# Patient Record
Sex: MALE | Race: WHITE | NOT HISPANIC OR LATINO
[De-identification: names, ages, dates, MRNs, and addresses within clinical notes are randomized per-mention and may not be internally consistent; named-entity substitution may affect disease eponyms.]

---

## 2017-12-15 PROBLEM — Z00.00 ENCOUNTER FOR PREVENTIVE HEALTH EXAMINATION: Status: ACTIVE | Noted: 2017-12-15

## 2017-12-27 ENCOUNTER — APPOINTMENT (OUTPATIENT)
Dept: CARDIOLOGY | Facility: CLINIC | Age: 67
End: 2017-12-27

## 2019-05-24 ENCOUNTER — RECORD ABSTRACTING (OUTPATIENT)
Age: 69
End: 2019-05-24

## 2019-05-24 DIAGNOSIS — Z78.9 OTHER SPECIFIED HEALTH STATUS: ICD-10-CM

## 2019-05-24 DIAGNOSIS — Z82.3 FAMILY HISTORY OF STROKE: ICD-10-CM

## 2019-05-28 ENCOUNTER — NON-APPOINTMENT (OUTPATIENT)
Age: 69
End: 2019-05-28

## 2019-05-28 ENCOUNTER — APPOINTMENT (OUTPATIENT)
Dept: CARDIOLOGY | Facility: CLINIC | Age: 69
End: 2019-05-28
Payer: COMMERCIAL

## 2019-05-28 VITALS
WEIGHT: 306 LBS | BODY MASS INDEX: 45.32 KG/M2 | RESPIRATION RATE: 14 BRPM | HEART RATE: 76 BPM | DIASTOLIC BLOOD PRESSURE: 76 MMHG | HEIGHT: 69 IN | SYSTOLIC BLOOD PRESSURE: 138 MMHG

## 2019-05-28 DIAGNOSIS — E11.9 TYPE 2 DIABETES MELLITUS W/OUT COMPLICATIONS: ICD-10-CM

## 2019-05-28 PROCEDURE — 99215 OFFICE O/P EST HI 40 MIN: CPT

## 2019-05-28 PROCEDURE — 93000 ELECTROCARDIOGRAM COMPLETE: CPT

## 2019-05-28 RX ORDER — FUROSEMIDE 40 MG/1
40 TABLET ORAL
Refills: 0 | Status: ACTIVE | COMMUNITY

## 2019-05-29 NOTE — REASON FOR VISIT
[FreeTextEntry1] : The patient is here today for cardiac reevaluation with known history of hypertension, hyperlipidemia, insulin-dependent diabetes and recent congestive heart failure exacerbation-(HFpEF)- during hospital admission at [facility].  He reports running out of his diuretics (Lasix 40 mg) for approximately one week and then ate chinese food a couple times, started feeling short of breath when going to bed and  then woke up around 2 am feeling very short of breath.  He was successfully diuresed by IV at the hospital, he reports feeling much better now and no additional cardiac complaints.  The patient denies CP, SOB, SOLORZANO, PND, orthopnea, palpitations, presyncope, syncope.

## 2019-05-29 NOTE — HISTORY OF PRESENT ILLNESS
[FreeTextEntry1] : Patient is tolerating cardiac medications without negative side effects including Quinapril 40 mg BID, Norvasc 10 mg QD, Lipitor 20 mg QHS, Hydralazine 50 mg TID, HCTZ 25 mg QD, Lasix 40 mg QD, Potassium 10 mEQ QD, Metoprolol Succinate 50 mg QD.\par \par Most recent Transthoracic Echocardiogram during his hospital course (5/13/2019):  Left ventricle wall thickness mildly increased with an EF of 60 to 65%, right ventricle mildly dilated, left and right atria mildly dilated, mild MR, trivial AR, mild TR, trivial MD, NO pericardial effusion.

## 2019-05-29 NOTE — DISCUSSION/SUMMARY
[FreeTextEntry1] : 1).  Patient is tolerating cardiac medications without negative side effects, continue with current cardiac medication regimen (refer above).\par \par 2).  Strongly encouraged patient to refrain from (salt loading) and begin eating a well balanced diet low in fats and carbohydrates.  He should begin an aerobic exercise regimen including walking / biking 4 to 5 days per week.\par \par 3).  Will discuss repeating a Nuclear Stress test in the future, possibly sometime next year.\par \par 4).  Follow up with PCP (Dr. Hood) regarding routine checkups and blood work, have copy faxed to our office. \par \par 5).  Immediately go to the emergency department and/or report any untoward symptoms. \par \par 6).  Follow up with our office in 3 months or PRN.

## 2019-05-29 NOTE — PHYSICAL EXAM
[Normal Appearance] : normal appearance [General Appearance - In No Acute Distress] : no acute distress [Normal Oral Mucosa] : normal oral mucosa [Normal Conjunctiva] : the conjunctiva exhibited no abnormalities [Normal Jugular Venous A Waves Present] : normal jugular venous A waves present [No Jugular Venous Jaime A Waves] : no jugular venous jaime A waves [Normal Jugular Venous V Waves Present] : normal jugular venous V waves present [Respiration, Rhythm And Depth] : normal respiratory rhythm and effort [] : no respiratory distress [Auscultation Breath Sounds / Voice Sounds] : lungs were clear to auscultation bilaterally [Heart Rate And Rhythm] : heart rate and rhythm were normal [Heart Sounds] : normal S1 and S2 [Nail Clubbing] : no clubbing of the fingernails [Abnormal Walk] : normal gait [Bowel Sounds] : normal bowel sounds [Cyanosis, Localized] : no localized cyanosis [Impaired Insight] : insight and judgment were intact [Oriented To Time, Place, And Person] : oriented to person, place, and time [Skin Color & Pigmentation] : normal skin color and pigmentation [Affect] : the affect was normal [FreeTextEntry1] : Grade I/VI systolic murmur, 1 to 2 + lower extremity edema with hyperpigmentation.

## 2019-05-29 NOTE — ASSESSMENT
[FreeTextEntry1] : EKG 5/28/2019:  The EKG illustrates sinus rhythm, rate of 76, no significant ST-T wave changes.  Essentially unchanged.\par \par Most recent Blood work (5/14/2019):  Pro BNP (1,453), Troponins negative x 4, Potassium (4.3) Sodium (140), creatinine (1.5), BUN (39).

## 2019-09-04 ENCOUNTER — APPOINTMENT (OUTPATIENT)
Dept: CARDIOLOGY | Facility: CLINIC | Age: 69
End: 2019-09-04

## 2021-12-17 ENCOUNTER — NON-APPOINTMENT (OUTPATIENT)
Age: 71
End: 2021-12-17

## 2021-12-18 ENCOUNTER — NON-APPOINTMENT (OUTPATIENT)
Age: 71
End: 2021-12-18

## 2021-12-22 ENCOUNTER — NON-APPOINTMENT (OUTPATIENT)
Age: 71
End: 2021-12-22

## 2021-12-22 ENCOUNTER — APPOINTMENT (OUTPATIENT)
Dept: CARDIOLOGY | Facility: CLINIC | Age: 71
End: 2021-12-22
Payer: MEDICARE

## 2021-12-22 VITALS
WEIGHT: 314 LBS | BODY MASS INDEX: 46.51 KG/M2 | HEIGHT: 69 IN | SYSTOLIC BLOOD PRESSURE: 160 MMHG | DIASTOLIC BLOOD PRESSURE: 70 MMHG | HEART RATE: 69 BPM | RESPIRATION RATE: 16 BRPM

## 2021-12-22 PROCEDURE — 99214 OFFICE O/P EST MOD 30 MIN: CPT

## 2021-12-22 PROCEDURE — 93000 ELECTROCARDIOGRAM COMPLETE: CPT

## 2021-12-22 RX ORDER — POTASSIUM CHLORIDE 10 MEQ
10 CAPSULE, EXTENDED RELEASE ORAL
Refills: 0 | Status: DISCONTINUED | COMMUNITY
End: 2021-12-22

## 2021-12-22 RX ORDER — AMLODIPINE BESYLATE 10 MG/1
10 TABLET ORAL
Refills: 0 | Status: DISCONTINUED | COMMUNITY
End: 2021-12-22

## 2021-12-22 RX ORDER — QUINAPRIL HYDROCHLORIDE 40 MG/1
40 TABLET, FILM COATED ORAL
Refills: 0 | Status: DISCONTINUED | COMMUNITY
End: 2021-12-22

## 2021-12-22 RX ORDER — METOPROLOL SUCCINATE 50 MG/1
50 TABLET, EXTENDED RELEASE ORAL
Refills: 0 | Status: DISCONTINUED | COMMUNITY
End: 2021-12-22

## 2021-12-22 RX ORDER — HYDRALAZINE HYDROCHLORIDE 50 MG/1
50 TABLET ORAL
Qty: 270 | Refills: 1 | Status: DISCONTINUED | COMMUNITY
End: 2021-12-22

## 2021-12-22 RX ORDER — EMPAGLIFLOZIN 10 MG/1
10 TABLET, FILM COATED ORAL
Refills: 0 | Status: DISCONTINUED | COMMUNITY
End: 2021-12-22

## 2021-12-22 NOTE — ASSESSMENT
[FreeTextEntry1] : EKG demonstrated normal sinus rhythm at a rate of 69; borderline first degree AV block, left atrial abnormality, nonspecific T-wave flattening;\par \par In summary, this 71-year-old gentleman has a history ofhypertension, diastolic dysfunction and remote history for diastolic heart failure, insulin-dependent diabetes, chronic obesity and salt loading with chronic lymphedema of the lower extremities and recent hospitalization for pneumonia with some ongoing slight exertional dyspnea and cough but overall improved;\par \par Plan:\par \par Agree with continuing current medical regimen including diuretic;\par \par Reinforce importance again for low sodium diet and weight reducing diet;\par \par Recommended in the future vascular surgical evaluation for lower extremities;\par \par Patient to followup with pulmonary and renal in the near future for recently detected mild renal insufficiency;\par \par Followup to our office within 3-4 months or p.r.n.;'

## 2021-12-22 NOTE — HISTORY OF PRESENT ILLNESS
[FreeTextEntry1] : Today, patient states he still has some mild shortness of breath with exertion but overall feeling better. There is been no chills or fever. He denies chest pain, palpitations or dizziness.\par \par Patient has gained additional weight since his last visit over a year ago, and was also noted to have elevation of systolic blood pressure today;\par \par He is accompanied with his wife;\par \par Last nuclear stress test was in 2017-with pharmacologic protocol and was negative for ischemia on myocardial perfusion imaging;

## 2021-12-22 NOTE — PHYSICAL EXAM
[Well Developed] : well developed [Well Nourished] : well nourished [No Acute Distress] : no acute distress [Normal Conjunctiva] : normal conjunctiva [Normal Venous Pressure] : normal venous pressure [No Carotid Bruit] : no carotid bruit [Normal S1, S2] : normal S1, S2 [No Rub] : no rub [No Gallop] : no gallop [Good Air Entry] : good air entry [No Respiratory Distress] : no respiratory distress  [Soft] : abdomen soft [Non Tender] : non-tender [No Masses/organomegaly] : no masses/organomegaly [Normal Bowel Sounds] : normal bowel sounds [Normal Gait] : normal gait [Venous stasis] : venous stasis [Venous varicosities] : venous varicosities [No Rash] : no rash [Moves all extremities] : moves all extremities [No Focal Deficits] : no focal deficits [Normal Speech] : normal speech [Alert and Oriented] : alert and oriented [Normal memory] : normal memory [de-identified] : regular rhythm, distant S1-S2, grade 1-2/6 systolic murmur [de-identified] : slightly diminished breath sounds bilaterally with a few scant rhonchi with cough [de-identified] : obese 3-4+ [de-identified] : chronic lymphedema and stasis dermatosis of the lower extremities at least 3+ bilaterally [de-identified] : dusky changes stasis dermatosis of lower extremity

## 2021-12-22 NOTE — REASON FOR VISIT
[Cardiac Failure] : cardiac failure [Arrhythmia/ECG Abnorrmalities] : arrhythmia/ECG abnormalities [Structural Heart and Valve Disease] : structural heart and valve disease [Hyperlipidemia] : hyperlipidemia [Hypertension] : hypertension [Spouse] : spouse [FreeTextEntry3] : VAL PURI [FreeTextEntry1] : The patient is a 71-year-old white male with a history for hypertension and hyperlipidemia as well as insulin-dependent diabetes and prior history of severe obesity with fluid overload and congestive heart failure believed to be secondary to "diastolic dysfunction or HFpEF); chronic lymphedema and swelling of the lower extremities;\par \par Patient was lost to followup over the past year and a half but presents back to the office today following urgent hospitalization for dyspnea, PND orthopnea, with cough congestion and found to have pneumonia;\par \par He was admitted to [facility] and seen by cardiology on December 18, 2021;\par \par Ultimately, patient was discharged home a few days later after being treated with antibiotics and given IV Lasix but no definite fluid overload was found at the time;

## 2021-12-22 NOTE — REVIEW OF SYSTEMS
[Weight Gain (___ Lbs)] : [unfilled] ~Ulb weight gain [Dyspnea on exertion] : dyspnea during exertion [Lower Ext Edema] : lower extremity edema [Cough] : cough [Negative] : Heme/Lymph

## 2022-01-01 ENCOUNTER — APPOINTMENT (OUTPATIENT)
Dept: CARDIOLOGY | Facility: CLINIC | Age: 72
End: 2022-01-01

## 2022-01-01 ENCOUNTER — APPOINTMENT (OUTPATIENT)
Dept: CARDIOLOGY | Facility: CLINIC | Age: 72
End: 2022-01-01
Payer: MEDICARE

## 2022-01-01 ENCOUNTER — NON-APPOINTMENT (OUTPATIENT)
Age: 72
End: 2022-01-01

## 2022-01-01 VITALS
BODY MASS INDEX: 45.77 KG/M2 | SYSTOLIC BLOOD PRESSURE: 152 MMHG | DIASTOLIC BLOOD PRESSURE: 84 MMHG | WEIGHT: 309 LBS | HEART RATE: 71 BPM | HEIGHT: 69 IN | RESPIRATION RATE: 16 BRPM

## 2022-01-01 VITALS
WEIGHT: 302 LBS | RESPIRATION RATE: 16 BRPM | HEIGHT: 69 IN | BODY MASS INDEX: 44.73 KG/M2 | SYSTOLIC BLOOD PRESSURE: 190 MMHG | DIASTOLIC BLOOD PRESSURE: 97 MMHG | HEART RATE: 82 BPM

## 2022-01-01 VITALS
HEART RATE: 70 BPM | HEIGHT: 69 IN | RESPIRATION RATE: 16 BRPM | DIASTOLIC BLOOD PRESSURE: 88 MMHG | SYSTOLIC BLOOD PRESSURE: 170 MMHG | BODY MASS INDEX: 46.51 KG/M2 | WEIGHT: 314 LBS

## 2022-01-01 VITALS — SYSTOLIC BLOOD PRESSURE: 160 MMHG | DIASTOLIC BLOOD PRESSURE: 88 MMHG

## 2022-01-01 DIAGNOSIS — E78.5 HYPERLIPIDEMIA, UNSPECIFIED: ICD-10-CM

## 2022-01-01 DIAGNOSIS — I10 ESSENTIAL (PRIMARY) HYPERTENSION: ICD-10-CM

## 2022-01-01 DIAGNOSIS — R06.02 SHORTNESS OF BREATH: ICD-10-CM

## 2022-01-01 DIAGNOSIS — I50.9 HEART FAILURE, UNSPECIFIED: ICD-10-CM

## 2022-01-01 PROCEDURE — 99214 OFFICE O/P EST MOD 30 MIN: CPT | Mod: 25

## 2022-01-01 PROCEDURE — 93000 ELECTROCARDIOGRAM COMPLETE: CPT

## 2022-01-01 PROCEDURE — 78452 HT MUSCLE IMAGE SPECT MULT: CPT

## 2022-01-01 PROCEDURE — 99214 OFFICE O/P EST MOD 30 MIN: CPT

## 2022-01-01 PROCEDURE — A9500: CPT

## 2022-01-01 PROCEDURE — 93015 CV STRESS TEST SUPVJ I&R: CPT

## 2022-01-01 PROCEDURE — 93306 TTE W/DOPPLER COMPLETE: CPT

## 2022-01-01 RX ORDER — ISOSORBIDE MONONITRATE 30 MG/1
30 TABLET, EXTENDED RELEASE ORAL DAILY
Refills: 0 | Status: DISCONTINUED | COMMUNITY

## 2022-01-01 RX ORDER — CARVEDILOL 25 MG/1
25 TABLET, FILM COATED ORAL TWICE DAILY
Refills: 0 | Status: ACTIVE | COMMUNITY

## 2022-01-01 RX ORDER — HYDROCHLOROTHIAZIDE 25 MG/1
25 TABLET ORAL DAILY
Qty: 90 | Refills: 3 | Status: DISCONTINUED | COMMUNITY
Start: 2019-05-28 | End: 2022-01-01

## 2022-01-01 RX ORDER — NIFEDIPINE 30 MG/1
30 TABLET, EXTENDED RELEASE ORAL DAILY
Qty: 90 | Refills: 3 | Status: ACTIVE | COMMUNITY
Start: 2022-01-01 | End: 1900-01-01

## 2022-01-01 RX ORDER — REGADENOSON 0.08 MG/ML
0.4 INJECTION, SOLUTION INTRAVENOUS
Qty: 4 | Refills: 0 | Status: COMPLETED | OUTPATIENT
Start: 2022-01-01

## 2022-01-01 RX ORDER — AMLODIPINE BESYLATE AND BENAZEPRIL HYDROCHLORIDE 5; 40 MG/1; MG/1
5-40 CAPSULE ORAL
Qty: 90 | Refills: 0 | Status: DISCONTINUED | COMMUNITY
End: 2022-01-01

## 2022-01-01 RX ORDER — INSULIN LISPRO 100 [IU]/ML
INJECTION, SOLUTION INTRAVENOUS; SUBCUTANEOUS
Refills: 0 | Status: DISCONTINUED | COMMUNITY
End: 2022-01-01

## 2022-01-01 RX ORDER — ISOSORBIDE MONONITRATE 60 MG/1
60 TABLET, EXTENDED RELEASE ORAL DAILY
Refills: 0 | Status: DISCONTINUED | COMMUNITY

## 2022-01-01 RX ORDER — METOPROLOL TARTRATE 50 MG/1
50 TABLET, FILM COATED ORAL
Qty: 180 | Refills: 3 | Status: DISCONTINUED | COMMUNITY
Start: 2021-12-22 | End: 2022-01-01

## 2022-01-01 RX ORDER — ISOSORBIDE MONONITRATE 120 MG/1
120 TABLET, EXTENDED RELEASE ORAL
Refills: 0 | Status: ACTIVE | COMMUNITY

## 2022-01-01 RX ORDER — KIT FOR THE PREPARATION OF TECHNETIUM TC99M SESTAMIBI 1 MG/5ML
INJECTION, POWDER, LYOPHILIZED, FOR SOLUTION PARENTERAL
Refills: 0 | Status: COMPLETED | OUTPATIENT
Start: 2022-01-01

## 2022-01-01 RX ORDER — ATORVASTATIN CALCIUM 20 MG/1
20 TABLET, FILM COATED ORAL
Refills: 0 | Status: DISCONTINUED | COMMUNITY
End: 2022-01-01

## 2022-01-01 RX ORDER — PERFLUTREN 6.52 MG/ML
6.52 INJECTION, SUSPENSION INTRAVENOUS
Qty: 2 | Refills: 0 | Status: COMPLETED | OUTPATIENT
Start: 2022-01-01

## 2022-01-01 RX ADMIN — REGADENOSON 0 MG/5ML: 0.08 INJECTION, SOLUTION INTRAVENOUS at 00:00

## 2022-01-01 RX ADMIN — KIT FOR THE PREPARATION OF TECHNETIUM TC99M SESTAMIBI 0: 1 INJECTION, POWDER, LYOPHILIZED, FOR SOLUTION PARENTERAL at 00:00

## 2022-01-01 RX ADMIN — PERFLUTREN MG/ML: 6.52 INJECTION, SUSPENSION INTRAVENOUS at 00:00

## 2022-04-13 NOTE — HISTORY OF PRESENT ILLNESS
[FreeTextEntry1] : He denies chest pain, shortness of breath, palpitations, dizziness or syncope;\par \par Pharmacologic nuclear stress test from March 28, 2022 shows no symptoms of chest pain. No arrhythmias seen. Blood pressure response was borderline elevated. EKG remained negative for additional ST changes. Myocardial perfusion images on multiple views demonstrated a small-sized defect in the apex and apical inferior wall suggesting apical thinning and/or ischemia; there was a defect in the basal inferior and basal inferoseptum suggesting ischemia and or diaphragm attenuation artifact in this patient because of his body habitus; LVEF was in the lower range of normal at 52%;\par \par Patient is anticipating also vein ligation of the right lower extremity in the near future as well;\par \par Blood pressure today. Initially uncontrolled and repeat after seems to be improving. Home blood pressure readings reported to be better;

## 2022-04-13 NOTE — ASSESSMENT
[FreeTextEntry1] : eKG shows normal sinus rhythm at a rate of 82; Nonspecific ST-T changes. Nonacute;\par \par In summary, this 72-year-old gentleman has a history forcardiomegaly, fluid overload secondary to diastolic dysfunction and probably HFpEF, in the recent past with renal insufficiency and chronic hypertension with morbid obesity;\par \par he's had some significant improvement apparently in his renal function studies and has lost some additional weight and recently had a venous ligation and treatment of his varicosities in the lower extremities and chronic edema;\par He demonstrates a mildly abnormal nuclear stress test suggesting a small degree of apical ischemia versus soft tissue attenuation artifact a technically difficult study;\par Otherwise, he remains asymptomatic from the cardiac standpoint;\par \par Plan:\par \par Patient encouraged to continue ongoing low sodium low carbohydrate reducing diet;\par \par Patient recommended to continue to monitor home blood pressure readings on a periodic basis and followup with primary care and nephrology;\par \par Recommend hold off on any further cardiac testing such as cardiac catheterization as patient remains asymptomatic and it was a "low risk scan";\par \par Patient to continue current medical regimen;\par \par Discuss possibility of cardiac catheterization should patient develop any significant chest pain or chest symptoms and we'll continue to monitor patient clinically as well;\par \par Followup is in 4 months or p.r.n.;

## 2022-04-13 NOTE — REVIEW OF SYSTEMS
[Weight Loss (___ Lbs)] : [unfilled] ~Ulb weight loss [Lower Ext Edema] : lower extremity edema [Negative] : Heme/Lymph

## 2022-04-13 NOTE — REASON FOR VISIT
[Arrhythmia/ECG Abnorrmalities] : arrhythmia/ECG abnormalities [Structural Heart and Valve Disease] : structural heart and valve disease [Hyperlipidemia] : hyperlipidemia [Hypertension] : hypertension [Other: ____] : [unfilled] [FreeTextEntry3] : VAL Hood [FreeTextEntry1] : The patient is a 72-year-old white male who has a history for insulin-dependent diabetes, hypertension,  and obesity with recent history in December 2021 of congestive heart failure and fluid overload believed to be secondary to "diastolic dysfunction" (HFpEF), and mild to moderate chronic renal insufficiency;\par \par He was treated aggressively in the hospital with medical regimen and symptomatically improved.\par \par He returns to the office today after being evaluated as an outpatient with nephrology (Dr. Castellano), with recent blood pressure medication adjusted; and recently undergoing vein ligation in the left lower extremity with leg wraps were severe peripheral edema and venous insufficiency;\par \par Patient recently underwent pharmacologic myocardial perfusion stress test and here to discuss those results;\par \par Overall, he states he has been feeling better and following a proper diet and lost almost 17 pounds and hasn't leg wraps of the lower extremity and recent adjustment in his blood pressure medication;

## 2022-04-13 NOTE — PHYSICAL EXAM
[Well Developed] : well developed [Well Nourished] : well nourished [Obese] : obese [Normal Conjunctiva] : normal conjunctiva [Normal Venous Pressure] : normal venous pressure [No Carotid Bruit] : no carotid bruit [Normal S1, S2] : normal S1, S2 [No Rub] : no rub [No Gallop] : no gallop [Clear Lung Fields] : clear lung fields [Good Air Entry] : good air entry [No Respiratory Distress] : no respiratory distress  [Soft] : abdomen soft [Non Tender] : non-tender [No Masses/organomegaly] : no masses/organomegaly [Normal Bowel Sounds] : normal bowel sounds [Normal Gait] : normal gait [No Cyanosis] : no cyanosis [No Clubbing] : no clubbing [No Rash] : no rash [No Skin Lesions] : no skin lesions [Moves all extremities] : moves all extremities [No Focal Deficits] : no focal deficits [Normal Speech] : normal speech [Alert and Oriented] : alert and oriented [Normal memory] : normal memory [de-identified] : Alert, morbidly obese white male in no acute distress; [de-identified] : regular rhythm, grade 1/6 soft systolic murmur; distant S1-S2; [de-identified] : Obese 3+; [de-identified] : Bilateral leg wraps and obesity with edema lower extremities;

## 2022-08-16 PROBLEM — I50.9 HEART FAILURE: Status: ACTIVE | Noted: 2019-05-28

## 2022-08-16 NOTE — ASSESSMENT
[FreeTextEntry1] : eKG shows normal sinus rhythm at a rate of 71; Nonspecific ST-T changes. PRWP V1-3;  Nonacute;\par \par In summary, this 72-year-old gentleman has a history for cardiomegaly, fluid overload secondary to diastolic dysfunction and probably HFpEF, in the recent past with renal insufficiency and chronic hypertension with morbid obesity;\par \par he's had some significant improvement apparently in his renal function studies and has lost some additional weight and recently had a venous ligation and treatment of his varicosities in the lower extremities and chronic edema;\par \par He demonstrates a mildly abnormal nuclear stress test suggesting a small degree of apical ischemia versus soft tissue attenuation artifact a technically difficult study;\par Otherwise, he remains relatively asymptomatic from the cardiac standpoint on current medical regimen-with mild systolic hypertension today;\par \par \par Plan:\par \par Patient encouraged to continue ongoing low sodium low carbohydrate reducing diet;\par \par Patient recommended to continue to monitor home blood pressure readings on a periodic basis and followup with primary care and nephrology;\par \par \par Patient to continue current medical regimen; recommend transthoracic echocardiogram prior to next visit;\par \par Discuss possibility of cardiac catheterization should patient develop any significant chest pain or chest symptoms and we'll continue to monitor patient clinically as well;\par \par Followup is in 4 months or p.r.n.;

## 2022-08-16 NOTE — PHYSICAL EXAM
[Well Developed] : well developed [Well Nourished] : well nourished [Obese] : obese [Normal Conjunctiva] : normal conjunctiva [Normal Venous Pressure] : normal venous pressure [No Carotid Bruit] : no carotid bruit [Normal S1, S2] : normal S1, S2 [No Rub] : no rub [No Gallop] : no gallop [Clear Lung Fields] : clear lung fields [Good Air Entry] : good air entry [No Respiratory Distress] : no respiratory distress  [Soft] : abdomen soft [Non Tender] : non-tender [No Masses/organomegaly] : no masses/organomegaly [Normal Bowel Sounds] : normal bowel sounds [Normal Gait] : normal gait [No Cyanosis] : no cyanosis [No Clubbing] : no clubbing [No Rash] : no rash [No Skin Lesions] : no skin lesions [Moves all extremities] : moves all extremities [No Focal Deficits] : no focal deficits [Normal Speech] : normal speech [Alert and Oriented] : alert and oriented [Normal memory] : normal memory [de-identified] : Alert, morbidly obese white male in no acute distress; [de-identified] : regular rhythm, grade 1/6 soft systolic murmur; distant S1-S2; [de-identified] : Obese 3+; [de-identified] : Bilateral leg wraps and obesity with edema lower extremities;

## 2022-08-16 NOTE — HISTORY OF PRESENT ILLNESS
[FreeTextEntry1] : He was trying to follow a better diet and did lose some weight but then only to gain some of it back he reports;\par \par Pharmacologic nuclear stress test from March 28, 2022 shows no symptoms of chest pain. No arrhythmias seen. Blood pressure response was borderline elevated. EKG remained negative for additional ST changes. Myocardial perfusion images on multiple views demonstrated a small-sized defect in the apex and apical inferior wall suggesting apical thinning and/or ischemia; there was a defect in the basal inferior and basal inferoseptum suggesting ischemia and or diaphragm attenuation artifact in this patient because of his body habitus; LVEF was in the lower range of normal at 52%;\par \par \par Blood pressure is slowly improving.  But still somewhat elevated systolic pressure in office today ;home blood pressure readings reported to be better;

## 2022-08-16 NOTE — REASON FOR VISIT
[Arrhythmia/ECG Abnorrmalities] : arrhythmia/ECG abnormalities [Structural Heart and Valve Disease] : structural heart and valve disease [Hyperlipidemia] : hyperlipidemia [Hypertension] : hypertension [Other: ____] : [unfilled] [FreeTextEntry3] : VAL Hood [FreeTextEntry1] : The patient is a 72-year-old white male with obesity , who has a history for insulin-dependent diabetes, hypertension,  with recent history in December 2021 of congestive heart failure and fluid overload believed to be secondary to "diastolic dysfunction" (HFpEF), and mild to moderate chronic renal insufficiency;\par \par (He was treated aggressively in the hospital with medical regimen and symptomatically improved)\par \par He returns to the office today after being evaluated as an outpatient with nephrology (Dr. Castellano), with recent blood pressure medication adjusted; and recently undergoing vein ligation in the left lower extremity with leg wraps were severe peripheral edema and venous insufficiency;\par \par Overall, with recent blood pressure medication change from nephrology and continued treatments for the lower extremities with compression Velcro wraps, he states he has been generally feeling well.  He gets some exertional dyspnea when carrying things but otherwise denies chest pain, palpitations, PND orthopnea or dizziness;\par \par Overall, he states he has been feeling better and following a proper diet and lost almost 17 pounds and hasn't leg wraps of the lower extremity and recent adjustment in his blood pressure medication;

## 2022-12-29 PROBLEM — R06.02 SOB (SHORTNESS OF BREATH): Status: ACTIVE | Noted: 2021-12-22

## 2022-12-29 PROBLEM — E78.5 HYPERLIPIDEMIA: Status: ACTIVE | Noted: 2019-05-24

## 2022-12-29 PROBLEM — I10 HYPERTENSION: Status: ACTIVE | Noted: 2019-05-24

## 2022-12-29 NOTE — HISTORY OF PRESENT ILLNESS
[FreeTextEntry1] : Transthoracic echocardiogram completed November 29, 2022 shows mild to moderate LVH with mild enlargement of the left ventricular internal dimensions.  There was mild to moderate global hypokinesis of the left ventricle with ejection fraction reduced in the range of 40%.\par Mild left atrium enlarged moderate RV enlargement with mild RA enlargement and severe elevated PA systolic pressures moderate TR and mild to moderate MR; no pericardial effusion\par \par ;\par Pharmacologic nuclear stress test from March 28, 2022 shows no symptoms of chest pain. No arrhythmias seen. Blood pressure response was borderline elevated. EKG remained negative for additional ST changes. Myocardial perfusion images on multiple views demonstrated a small-sized defect in the apex and apical inferior wall suggesting apical thinning and/or ischemia; there was a defect in the basal inferior and basal inferoseptum suggesting ischemia and or diaphragm attenuation artifact in this patient because of his body habitus; LVEF was in the lower range of normal at 52%;\par \par \par

## 2022-12-29 NOTE — PHYSICAL EXAM
[Well Developed] : well developed [Well Nourished] : well nourished [Obese] : obese [Normal Conjunctiva] : normal conjunctiva [Normal Venous Pressure] : normal venous pressure [No Carotid Bruit] : no carotid bruit [Normal S1, S2] : normal S1, S2 [No Rub] : no rub [No Gallop] : no gallop [Clear Lung Fields] : clear lung fields [Good Air Entry] : good air entry [No Respiratory Distress] : no respiratory distress  [Soft] : abdomen soft [Non Tender] : non-tender [No Masses/organomegaly] : no masses/organomegaly [Normal Bowel Sounds] : normal bowel sounds [Normal Gait] : normal gait [No Cyanosis] : no cyanosis [No Clubbing] : no clubbing [No Rash] : no rash [No Skin Lesions] : no skin lesions [Moves all extremities] : moves all extremities [No Focal Deficits] : no focal deficits [Normal Speech] : normal speech [Alert and Oriented] : alert and oriented [Normal memory] : normal memory [de-identified] : Alert, morbidly obese white male in no acute distress; [de-identified] : regular rhythm, grade 1/6 soft systolic murmur; distant S1-S2; [de-identified] : Obese 3+; [de-identified] : Bilateral leg wraps and obesity with edema lower extremities;

## 2022-12-29 NOTE — REASON FOR VISIT
[Arrhythmia/ECG Abnorrmalities] : arrhythmia/ECG abnormalities [Structural Heart and Valve Disease] : structural heart and valve disease [Hyperlipidemia] : hyperlipidemia [Hypertension] : hypertension [Other: ____] : [unfilled] [FreeTextEntry3] : VAL Hood [FreeTextEntry1] : The patient is a 72-year-old white male with obesity , who has a history for insulin-dependent diabetes, hypertension,  with recent history in December 2021 of congestive heart failure and fluid overload believed to be secondary to "diastolic dysfunction" (HFpEF), and mild to moderate chronic renal insufficiency;\par \par (He was treated aggressively in the hospital with medical regimen and symptomatically improved)\par \par He returns to the office today after being evaluated as an outpatient with nephrology (Dr. Castellano), with recent blood pressure medication adjusted; and recently undergoing vein ligation in the left lower extremity with leg wraps were severe peripheral edema and venous insufficiency;\par \par \par It appears he has gained back some of his weight and remains significantly obese; systolic blood pressure today also appears uncontrolled;\par \par Patient denies any significant chest pain but does get some exertional dyspnea at times.  There is been no PND or orthopnea.  No palpitations or dizziness;

## 2022-12-29 NOTE — ASSESSMENT
[FreeTextEntry1] : EKG shows normal sinus rhythm at a rate of 70;   Nonspecific ST-T changes. PRWP V1-3;  Nonacute;\par \par In summary, this 72-year-old gentleman has a history for cardiomegaly, fluid overload secondary to diastolic dysfunction and now also found to have on recent echo, mild to moderate LV dysfunction with moderate cardiomegaly and hypertrophy with EF in the range of 40%;,;\par \par \par He's had some significant improvement apparently in his renal function studies; and apparently recent adjustment of his carvedilol upwards (?)-(Patient unsure)\par \par He demonstrates a mildly abnormal nuclear stress test suggesting a small degree of apical ischemia versus soft tissue attenuation artifact a technically difficult study;\par Occasional exertional dyspnea exhibited as well reported;\par \par \par Plan:\par \par Recommend empirically adding nifedipine 30 mg p.o. daily for additional systolic blood pressure control;\par \par Counseled patient again on the importance of adhering to a low-sodium, low-carb weight reducing diet and has suggested weight watchers style diet which may have helped him in the past he states;\par \par \par Patient recommended to continue to monitor home blood pressure readings on a periodic basis and followup with primary care and nephrology;\par \par Will send for recent laboratory blood tests and renal profile; doubt patient would be candidate for Entresto-because of history of renal insufficiency but will consider discussing with nephrology in the future (Dr. Castellano)\par \par Followup is in 3  months or p.r.n.;

## 2023-01-10 ENCOUNTER — OFFICE (OUTPATIENT)
Dept: URBAN - METROPOLITAN AREA CLINIC 94 | Facility: CLINIC | Age: 73
Setting detail: OPHTHALMOLOGY
End: 2023-01-10
Payer: MEDICARE

## 2023-01-10 DIAGNOSIS — E11.3313: ICD-10-CM

## 2023-01-10 DIAGNOSIS — E11.3311: ICD-10-CM

## 2023-01-10 DIAGNOSIS — H35.723: ICD-10-CM

## 2023-01-10 DIAGNOSIS — H35.3132: ICD-10-CM

## 2023-01-10 DIAGNOSIS — H35.371: ICD-10-CM

## 2023-01-10 PROCEDURE — 92134 CPTRZ OPH DX IMG PST SGM RTA: CPT | Performed by: OPHTHALMOLOGY

## 2023-01-10 PROCEDURE — 92235 FLUORESCEIN ANGRPH MLTIFRAME: CPT | Performed by: OPHTHALMOLOGY

## 2023-01-10 PROCEDURE — 67028 INJECTION EYE DRUG: CPT | Performed by: OPHTHALMOLOGY

## 2023-01-10 ASSESSMENT — REFRACTION_AUTOREFRACTION
OS_CYLINDER: 0.00
OD_CYLINDER: -0.50
OS_AXIS: 0.00
OD_SPHERE: +0.25
OS_SPHERE: 0.00
OD_AXIS: 143

## 2023-01-10 ASSESSMENT — VISUAL ACUITY
OD_BCVA: 20/60
OS_BCVA: 20/300

## 2023-01-10 ASSESSMENT — CONFRONTATIONAL VISUAL FIELD TEST (CVF)
OD_FINDINGS: FULL
OS_FINDINGS: FULL

## 2023-01-10 ASSESSMENT — KERATOMETRY
OD_K1POWER_DIOPTERS: 44.00
OS_K2POWER_DIOPTERS: 45.00
OS_K1POWER_DIOPTERS: 44.50
OD_K2POWER_DIOPTERS: 44.75
OS_AXISANGLE_DEGREES: 131
OD_AXISANGLE_DEGREES: 179

## 2023-01-10 ASSESSMENT — CORNEAL DYSTROPHY - POSTERIOR
OD_POSTERIORDYSTROPHY: 1+
OS_POSTERIORDYSTROPHY: 1+

## 2023-01-10 ASSESSMENT — TONOMETRY
OS_IOP_MMHG: 12
OD_IOP_MMHG: 15

## 2023-01-10 ASSESSMENT — AXIALLENGTH_DERIVED
OS_AL: 23.1415
OD_AL: 23.2748

## 2023-01-10 ASSESSMENT — SPHEQUIV_DERIVED
OD_SPHEQUIV: 0
OS_SPHEQUIV: 0

## 2023-01-24 ENCOUNTER — OFFICE (OUTPATIENT)
Dept: URBAN - METROPOLITAN AREA CLINIC 94 | Facility: CLINIC | Age: 73
Setting detail: OPHTHALMOLOGY
End: 2023-01-24
Payer: MEDICARE

## 2023-01-24 DIAGNOSIS — H35.371: ICD-10-CM

## 2023-01-24 DIAGNOSIS — H35.723: ICD-10-CM

## 2023-01-24 DIAGNOSIS — H35.3132: ICD-10-CM

## 2023-01-24 DIAGNOSIS — E11.3312: ICD-10-CM

## 2023-01-24 DIAGNOSIS — E11.3313: ICD-10-CM

## 2023-01-24 PROCEDURE — 92134 CPTRZ OPH DX IMG PST SGM RTA: CPT | Performed by: OPHTHALMOLOGY

## 2023-01-24 PROCEDURE — 67028 INJECTION EYE DRUG: CPT | Performed by: OPHTHALMOLOGY

## 2023-01-24 ASSESSMENT — REFRACTION_AUTOREFRACTION
OS_AXIS: 0.00
OD_AXIS: 143
OS_SPHERE: 0.00
OS_CYLINDER: 0.00
OD_SPHERE: +0.25
OD_CYLINDER: -0.50

## 2023-01-24 ASSESSMENT — CORNEAL DYSTROPHY - POSTERIOR
OD_POSTERIORDYSTROPHY: 1+
OS_POSTERIORDYSTROPHY: 1+

## 2023-01-24 ASSESSMENT — VISUAL ACUITY
OS_BCVA: 20/250
OD_BCVA: 20/60+

## 2023-01-24 ASSESSMENT — KERATOMETRY
OS_AXISANGLE_DEGREES: 131
OS_K1POWER_DIOPTERS: 44.50
OS_K2POWER_DIOPTERS: 45.00
OD_AXISANGLE_DEGREES: 179
OD_K2POWER_DIOPTERS: 44.75
OD_K1POWER_DIOPTERS: 44.00

## 2023-01-24 ASSESSMENT — TONOMETRY
OS_IOP_MMHG: 12
OD_IOP_MMHG: 15

## 2023-01-24 ASSESSMENT — AXIALLENGTH_DERIVED
OD_AL: 23.2748
OS_AL: 23.1415

## 2023-01-24 ASSESSMENT — SPHEQUIV_DERIVED
OD_SPHEQUIV: 0
OS_SPHEQUIV: 0

## 2023-01-24 ASSESSMENT — CONFRONTATIONAL VISUAL FIELD TEST (CVF)
OD_FINDINGS: FULL
OS_FINDINGS: FULL

## 2023-03-21 ENCOUNTER — APPOINTMENT (OUTPATIENT)
Dept: CARDIOLOGY | Facility: CLINIC | Age: 73
End: 2023-03-21